# Patient Record
Sex: FEMALE | Race: WHITE | HISPANIC OR LATINO | URBAN - METROPOLITAN AREA
[De-identification: names, ages, dates, MRNs, and addresses within clinical notes are randomized per-mention and may not be internally consistent; named-entity substitution may affect disease eponyms.]

---

## 2020-07-22 ENCOUNTER — CLAIMS CREATED FROM THE CLAIM WINDOW (OUTPATIENT)
Dept: URBAN - METROPOLITAN AREA CLINIC 4 | Facility: CLINIC | Age: 65
End: 2020-07-22
Payer: COMMERCIAL

## 2020-07-22 ENCOUNTER — OFFICE VISIT (OUTPATIENT)
Dept: URBAN - METROPOLITAN AREA SURGERY CENTER 15 | Facility: SURGERY CENTER | Age: 65
End: 2020-07-22
Payer: COMMERCIAL

## 2020-07-22 DIAGNOSIS — K63.5 POLYP OF COLON: ICD-10-CM

## 2020-07-22 DIAGNOSIS — K63.5 BENIGN COLON POLYP: ICD-10-CM

## 2020-07-22 DIAGNOSIS — K63.89 BACTERIAL OVERGROWTH SYNDROME: ICD-10-CM

## 2020-07-22 DIAGNOSIS — D12.1 BENIGN NEOPLASM OF APPENDIX: ICD-10-CM

## 2020-07-22 DIAGNOSIS — D12.5 BENIGN NEOPLASM OF SIGMOID COLON: ICD-10-CM

## 2020-07-22 DIAGNOSIS — D12.2 BENIGN NEOPLASM OF ASCENDING COLON: ICD-10-CM

## 2020-07-22 DIAGNOSIS — R11.2 NAUSEA WITH VOMITING, UNSPECIFIED: ICD-10-CM

## 2020-07-22 DIAGNOSIS — D12.0 BENIGN NEOPLASM OF CECUM: ICD-10-CM

## 2020-07-22 DIAGNOSIS — Z86.010 H/O ADENOMATOUS POLYP OF COLON: ICD-10-CM

## 2020-07-22 DIAGNOSIS — R10.9 UNSPECIFIED ABDOMINAL PAIN: ICD-10-CM

## 2020-07-22 DIAGNOSIS — K74.69 CIRRHOSIS, CRYPTOGENIC: ICD-10-CM

## 2020-07-22 DIAGNOSIS — D12.2 ADENOMATOUS POLYP OF ASCENDING COLON: ICD-10-CM

## 2020-07-22 DIAGNOSIS — R25.9 UNSPECIFIED ABNORMAL INVOLUNTARY MOVEMENTS: ICD-10-CM

## 2020-07-22 PROCEDURE — 88305 TISSUE EXAM BY PATHOLOGIST: CPT | Performed by: PATHOLOGY

## 2020-07-22 PROCEDURE — 45385 COLONOSCOPY W/LESION REMOVAL: CPT | Performed by: INTERNAL MEDICINE

## 2020-07-22 PROCEDURE — G9936 PMH PLYP/NEO CO/RECT/JUN/ANS: HCPCS | Performed by: INTERNAL MEDICINE

## 2020-07-22 PROCEDURE — 88312 SPECIAL STAINS GROUP 1: CPT | Performed by: PATHOLOGY

## 2020-07-22 PROCEDURE — 43239 EGD BIOPSY SINGLE/MULTIPLE: CPT | Performed by: INTERNAL MEDICINE

## 2020-07-22 PROCEDURE — 45380 COLONOSCOPY AND BIOPSY: CPT | Performed by: INTERNAL MEDICINE

## 2020-07-22 PROCEDURE — G8907 PT DOC NO EVENTS ON DISCHARG: HCPCS | Performed by: INTERNAL MEDICINE

## 2020-08-20 ENCOUNTER — OFFICE VISIT (OUTPATIENT)
Dept: URBAN - METROPOLITAN AREA CLINIC 78 | Facility: CLINIC | Age: 65
End: 2020-08-20
Payer: COMMERCIAL

## 2020-08-20 DIAGNOSIS — K63.5 COLON POLYPS: ICD-10-CM

## 2020-08-20 DIAGNOSIS — C50.919 BREAST CANCER: ICD-10-CM

## 2020-08-20 DIAGNOSIS — K74.60 CIRRHOSIS: ICD-10-CM

## 2020-08-20 PROCEDURE — 99214 OFFICE O/P EST MOD 30 MIN: CPT | Performed by: INTERNAL MEDICINE

## 2020-08-20 PROCEDURE — 3017F COLORECTAL CA SCREEN DOC REV: CPT | Performed by: INTERNAL MEDICINE

## 2020-08-20 PROCEDURE — G8427 DOCREV CUR MEDS BY ELIG CLIN: HCPCS | Performed by: INTERNAL MEDICINE

## 2020-08-20 PROCEDURE — G9903 PT SCRN TBCO ID AS NON USER: HCPCS | Performed by: INTERNAL MEDICINE

## 2020-08-20 PROCEDURE — G8417 CALC BMI ABV UP PARAM F/U: HCPCS | Performed by: INTERNAL MEDICINE

## 2020-08-20 NOTE — HPI-TODAY'S VISIT:
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 tyrell Banks). Her current MELD score is 6. The patient has a history of alochol abuse as well in the past. She has been abstinent of any alcohol use for at several years. She is also compliant with a low Na diet and Lasix, therefore she hasn't had much issues with ascitic fluid accumulating. Her last paracentesis was done in 2017.  She currently denies rectal bleeding, changes in bowel habits, abndominal pain, anorexia or unintentional weight loss. Upon further questioning, she also denies any heartburn, nausea, vomiting, or dysphagia.   She does use Miralax daily for mild chronic constipation. She had used Lactulose briefly in the past, but did not like the side effects.   She was due for another US for HCC screening in April 2020, but she said she had done at [facility]. She is thinking she might want to have her US/MRI done closer to her home from here on.  She denies any CP or STEVENS. She does take Metoprolol for SVT.  Today we reviewed the results of her E/C and path in detail. Summary of prior workup: - EGD and colonoscopy by me in 7/22/20 revealed a normal esophagus, erosive gastropathy and a normal duodenum/papilla. Biopsies were negative for H pylori.On colonoscopy the terminal ileum was normal. There were two TAs in the cecum, 9 mm SSP in the ascending colon, a 4 mm benign polyp in the descending, 2 HP polyps in the rectosigmoid, a medium size non-bleeding localized hepatic flexure varix versus prominent venous structure and non-bleeding internal hemorrhoids. The quality of the prep was good. A repeat colonoscopy was advised in 3 years. - MRI and US for HCC screening in 2018 were negative. - Colonoscopy 2017 with Dr. Banks - had polyps removed. The quality of the prep was fair.  - Patient has been followed at [facility] [place] for a known history of cirrhosis. - Labs on 8/2/17 revealed an AFP of 7.3, WBC 5.8, hemoglobin 14.8, MCV 95, platelets 136. INR 1.09, glucose 86, BUN 12, creatinine 0.7, calcium 11.3, Sodium 149, TP 8.2, Albumin 5.0, AST 36, ALT 27, AP 88, TB 0.7.  42 ( high). MELD score 7. - EGD for screening of esophageal varices/gastric varices was performed on 5/2017. A repeat was recommended in 2019. - Colon cancer screening colonoscopy in 2014 They Dr. Kennedy revealed 2 polyps in the ascending colon (a TA and a HP polyp).

## 2023-05-09 NOTE — PHYSICAL EXAM NECK/THYROID:
normal appearance , without tenderness upon palpation , no deformities , trachea midline , Thyroid normal size , no thyroid nodules , no masses , no JVD , thyroid nontender Unna Boot Text: An Unna boot was placed to help immobilize the limb and facilitate more rapid healing.

## 2023-10-05 ENCOUNTER — OFFICE VISIT (OUTPATIENT)
Dept: URBAN - METROPOLITAN AREA CLINIC 78 | Facility: CLINIC | Age: 68
End: 2023-10-05

## 2024-01-04 ENCOUNTER — DASHBOARD ENCOUNTERS (OUTPATIENT)
Age: 69
End: 2024-01-04

## 2024-01-04 ENCOUNTER — OFFICE VISIT (OUTPATIENT)
Dept: URBAN - METROPOLITAN AREA CLINIC 78 | Facility: CLINIC | Age: 69
End: 2024-01-04
Payer: COMMERCIAL

## 2024-01-04 VITALS
WEIGHT: 127.2 LBS | DIASTOLIC BLOOD PRESSURE: 64 MMHG | RESPIRATION RATE: 14 BRPM | HEIGHT: 62 IN | TEMPERATURE: 98.1 F | HEART RATE: 65 BPM | BODY MASS INDEX: 23.41 KG/M2 | SYSTOLIC BLOOD PRESSURE: 109 MMHG

## 2024-01-04 DIAGNOSIS — C50.919 BREAST CANCER: ICD-10-CM

## 2024-01-04 DIAGNOSIS — K63.5 COLON POLYPS: ICD-10-CM

## 2024-01-04 DIAGNOSIS — K74.69 OTHER CIRRHOSIS OF LIVER: ICD-10-CM

## 2024-01-04 DIAGNOSIS — C56.9 MALIGNANT NEOPLASM OF OVARY, UNSPECIFIED LATERALITY: ICD-10-CM

## 2024-01-04 PROBLEM — 363443007: Status: ACTIVE | Noted: 2024-01-04

## 2024-01-04 PROCEDURE — 99204 OFFICE O/P NEW MOD 45 MIN: CPT | Performed by: INTERNAL MEDICINE

## 2024-01-04 PROCEDURE — 99244 OFF/OP CNSLTJ NEW/EST MOD 40: CPT | Performed by: INTERNAL MEDICINE

## 2024-01-04 RX ORDER — POLYETHYLENE GLYCOL 3350, SODIUM SULFATE, POTASSIUM CHLORIDE, MAGNESIUM SULFATE, AND SODIUM CHLORIDE FOR ORAL SOLUTION 178.7-7.3G
AS DIRECTED KIT ORAL
OUTPATIENT
Start: 2024-01-04

## 2024-01-04 RX ORDER — ESTRADIOL 0.1 MG/G
AS DIRECTED CREAM VAGINAL
Status: ACTIVE | COMMUNITY

## 2024-01-04 RX ORDER — METOPROLOL SUCCINATE 25 MG/1
1 TABLET TABLET, FILM COATED, EXTENDED RELEASE ORAL ONCE A DAY
Status: ACTIVE | COMMUNITY

## 2024-01-04 RX ORDER — SERTRALINE 50 MG/1
1 TABLET TABLET, FILM COATED ORAL ONCE A DAY
Status: ACTIVE | COMMUNITY

## 2024-01-04 RX ORDER — ANASTROZOLE 1 MG/1
1 TABLET TABLET, FILM COATED ORAL ONCE A DAY
Status: ACTIVE | COMMUNITY

## 2024-01-04 RX ORDER — DICLOFENAC SODIUM TOPICAL GEL, 1% 10 MG/G
AS DIRECTED GEL TOPICAL
Status: ACTIVE | COMMUNITY

## 2024-01-04 RX ORDER — FUROSEMIDE 40 MG/1
1 TABLET TABLET ORAL ONCE A DAY
Status: ACTIVE | COMMUNITY

## 2024-01-04 RX ORDER — ROSUVASTATIN CALCIUM 10 MG/1
1 TABLET TABLET, COATED ORAL ONCE A DAY
Status: ACTIVE | COMMUNITY

## 2024-01-04 NOTE — HPI-TODAY'S VISIT:
The patient  follows wilea Lo.   She has known cirrhosis, likely secondary to MENDEZ, for which she is followed at [facility] (Dr. jamari Banks). Her current MELD score is less than 10. The patient has a history of alochol abuse as well in the past. She has been abstinent of any alcohol use for several years. She is also compliant with a low Na diet and Lasix, therefore she hasn't had much issues with ascitic fluid accumulating. Her last paracentesis was done in 2017. She is on diuretics. She was following with Dr. Jamari Banks at [facility]. She is up-to-date with US/AFP for HCC screening.  She has not had an EGD since the one I performed in 2020.  She currently denies rectal bleeding, changes in bowel habits, abndominal pain, anorexia or unintentional weight loss. Upon further questioning, she also denies any heartburn, nausea, vomiting, or dysphagia.   She does use Miralax prn for mild chronic constipation. She is eating a high fiber diet. She had used Lactulose briefly in the past, but did not like the side effects.   She denies any CP or STEVENS. She does take Metoprolol for SVT. She last her cardiologist a few months ago. EKG was unremarkable.   Summary of prior workup: - EGD and colonoscopy by me in 7/22/20 revealed a normal esophagus, erosive gastropathy and a normal duodenum/papilla. Biopsies were negative for H pylori.On colonoscopy the terminal ileum was normal. There were two TAs in the cecum, 9 mm SSP in the ascending colon, a 4 mm benign polyp in the descending, 2 HP polyps in the rectosigmoid, a medium size non-bleeding localized hepatic flexure varix versus prominent venous structure and non-bleeding internal hemorrhoids. The quality of the prep was good. A repeat colonoscopy was advised in 3 years. - MRI and US for HCC screening in 2018 were negative. - Colonoscopy 2017 with Dr. Banks - had polyps removed. The quality of the prep was fair.  - Patient has been followed at [facility] [place] for a known history of cirrhosis. - Labs on 8/2/17 revealed an AFP of 7.3, WBC 5.8, hemoglobin 14.8, MCV 95, platelets 136. INR 1.09, glucose 86, BUN 12, creatinine 0.7, calcium 11.3, Sodium 149, TP 8.2, Albumin 5.0, AST 36, ALT 27, AP 88, TB 0.7.  42 ( high). MELD score 7. - EGD for screening of esophageal varices/gastric varices was performed on 5/2017. A repeat was recommended in 2019. - Colon cancer screening colonoscopy in 2014 They Dr. Kennedy revealed 2 polyps in the ascending colon (a TA and a HP polyp).

## 2024-01-10 ENCOUNTER — TELEPHONE ENCOUNTER (OUTPATIENT)
Dept: URBAN - METROPOLITAN AREA CLINIC 78 | Facility: CLINIC | Age: 69
End: 2024-01-10

## 2024-02-07 ENCOUNTER — COL/EGD (OUTPATIENT)
Dept: URBAN - METROPOLITAN AREA SURGERY CENTER 15 | Facility: SURGERY CENTER | Age: 69
End: 2024-02-07
Payer: COMMERCIAL

## 2024-02-07 ENCOUNTER — LAB (OUTPATIENT)
Dept: URBAN - METROPOLITAN AREA CLINIC 4 | Facility: CLINIC | Age: 69
End: 2024-02-07
Payer: COMMERCIAL

## 2024-02-07 DIAGNOSIS — Z86.010 ADENOMAS PERSONAL HISTORY OF COLONIC POLYPS: ICD-10-CM

## 2024-02-07 DIAGNOSIS — K29.70 GASTRITIS, UNSPECIFIED, WITHOUT BLEEDING: ICD-10-CM

## 2024-02-07 DIAGNOSIS — K29.60 ADENOPAPILLOMATOSIS GASTRICA: ICD-10-CM

## 2024-02-07 DIAGNOSIS — D12.2 ADENOMA OF ASCENDING COLON: ICD-10-CM

## 2024-02-07 DIAGNOSIS — K74.69 CIRRHOSIS, CRYPTOGENIC: ICD-10-CM

## 2024-02-07 DIAGNOSIS — D12.3 ADENOMA OF TRANSVERSE COLON: ICD-10-CM

## 2024-02-07 PROCEDURE — 88305 TISSUE EXAM BY PATHOLOGIST: CPT | Performed by: PATHOLOGY

## 2024-02-07 PROCEDURE — 45380 COLONOSCOPY AND BIOPSY: CPT | Performed by: INTERNAL MEDICINE

## 2024-02-07 PROCEDURE — 43239 EGD BIOPSY SINGLE/MULTIPLE: CPT | Performed by: INTERNAL MEDICINE

## 2024-02-07 PROCEDURE — 45385 COLONOSCOPY W/LESION REMOVAL: CPT | Performed by: INTERNAL MEDICINE

## 2024-02-07 PROCEDURE — 88342 IMHCHEM/IMCYTCHM 1ST ANTB: CPT | Performed by: PATHOLOGY

## 2024-02-07 RX ORDER — ANASTROZOLE 1 MG/1
1 TABLET TABLET, FILM COATED ORAL ONCE A DAY
Status: ACTIVE | COMMUNITY

## 2024-02-07 RX ORDER — ESTRADIOL 0.1 MG/G
AS DIRECTED CREAM VAGINAL
Status: ACTIVE | COMMUNITY

## 2024-02-07 RX ORDER — SERTRALINE 50 MG/1
1 TABLET TABLET, FILM COATED ORAL ONCE A DAY
Status: ACTIVE | COMMUNITY

## 2024-02-07 RX ORDER — DICLOFENAC SODIUM TOPICAL GEL, 1% 10 MG/G
AS DIRECTED GEL TOPICAL
Status: ACTIVE | COMMUNITY

## 2024-02-07 RX ORDER — ROSUVASTATIN CALCIUM 10 MG/1
1 TABLET TABLET, COATED ORAL ONCE A DAY
Status: ACTIVE | COMMUNITY

## 2024-02-07 RX ORDER — METOPROLOL SUCCINATE 25 MG/1
1 TABLET TABLET, FILM COATED, EXTENDED RELEASE ORAL ONCE A DAY
Status: ACTIVE | COMMUNITY

## 2024-02-07 RX ORDER — FUROSEMIDE 40 MG/1
1 TABLET TABLET ORAL ONCE A DAY
Status: ACTIVE | COMMUNITY

## 2024-02-07 RX ORDER — POLYETHYLENE GLYCOL 3350, SODIUM SULFATE, POTASSIUM CHLORIDE, MAGNESIUM SULFATE, AND SODIUM CHLORIDE FOR ORAL SOLUTION 178.7-7.3G
AS DIRECTED KIT ORAL
Status: ACTIVE | COMMUNITY
Start: 2024-01-04

## 2024-03-22 NOTE — PHYSICAL EXAM SKIN:
no rashes , no jaundice present , good turgor , no masses , no tenderness on palpation [Fever] : fever [Nasal Congestion] : nasal congestion [Sore Throat] : sore throat [Cough] : cough [Vomiting] : no vomiting [Diarrhea] : no diarrhea